# Patient Record
Sex: MALE | Race: WHITE
[De-identification: names, ages, dates, MRNs, and addresses within clinical notes are randomized per-mention and may not be internally consistent; named-entity substitution may affect disease eponyms.]

---

## 2020-02-09 ENCOUNTER — HOSPITAL ENCOUNTER (EMERGENCY)
Dept: HOSPITAL 53 - M ED | Age: 30
Discharge: HOME | End: 2020-02-09
Payer: COMMERCIAL

## 2020-02-09 VITALS — SYSTOLIC BLOOD PRESSURE: 124 MMHG | DIASTOLIC BLOOD PRESSURE: 75 MMHG

## 2020-02-09 VITALS — HEIGHT: 72 IN | BODY MASS INDEX: 31.05 KG/M2 | WEIGHT: 229.28 LBS

## 2020-02-09 DIAGNOSIS — F10.231: Primary | ICD-10-CM

## 2020-02-09 DIAGNOSIS — F41.9: ICD-10-CM
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
## 2020-02-09 DIAGNOSIS — S00.03XA: ICD-10-CM

## 2020-02-09 DIAGNOSIS — Z79.899: ICD-10-CM

## 2020-02-09 LAB
ALBUMIN SERPL BCG-MCNC: 4.5 GM/DL (ref 3.2–5.2)
ALT SERPL W P-5'-P-CCNC: 43 U/L (ref 12–78)
AMPHETAMINES UR QL SCN: NEGATIVE
ANISOCYTOSIS BLD QL SMEAR: (no result)
APAP SERPL-MCNC: < 2 UG/ML (ref 10–30)
BARBITURATES UR QL SCN: NEGATIVE
BENZODIAZ UR QL SCN: NEGATIVE
BILIRUB CONJ SERPL-MCNC: 0.2 MG/DL (ref 0–0.2)
BILIRUB SERPL-MCNC: 0.7 MG/DL (ref 0.2–1)
BZE UR QL SCN: NEGATIVE
CANNABINOIDS UR QL SCN: NEGATIVE
CK MB CFR.DF SERPL CALC: 1.31
CK MB SERPL-MCNC: 2.3 NG/ML (ref ?–3.6)
CK SERPL-CCNC: 175 U/L (ref 39–308)
ETHANOL SERPL-MCNC: < 0.003 % (ref 0–0.01)
HCT VFR BLD AUTO: 45.4 % (ref 42–52)
HGB BLD-MCNC: 15.3 G/DL (ref 13.5–17.5)
LYMPHOCYTES NFR BLD MANUAL: 27 % (ref 16–44)
MACROCYTES BLD QL SMEAR: (no result)
MCH RBC QN AUTO: 29.9 PG (ref 27–33)
MCHC RBC AUTO-ENTMCNC: 33.7 G/DL (ref 32–36.5)
MCV RBC AUTO: 88.7 FL (ref 80–96)
METHADONE UR QL SCN: NEGATIVE
MONOCYTES NFR BLD MANUAL: 8 % (ref 0–5)
NEUTROPHILS NFR BLD MANUAL: 62 % (ref 28–66)
OPIATES UR QL SCN: NEGATIVE
PCP UR QL SCN: NEGATIVE
PLATELET # BLD AUTO: 287 10^3/UL (ref 150–450)
PLATELET BLD QL SMEAR: NORMAL
PROT SERPL-MCNC: 7.7 GM/DL (ref 6.4–8.2)
RBC # BLD AUTO: 5.12 10^6/UL (ref 4.3–6.1)
SALICYLATES SERPL-MCNC: < 1.7 MG/DL (ref 5–30)
TROPONIN I SERPL-MCNC: < 0.02 NG/ML (ref ?–0.1)
TSH SERPL DL<=0.005 MIU/L-ACNC: 1.57 UIU/ML (ref 0.36–3.74)
VARIANT LYMPHS NFR BLD MANUAL: 3 % (ref 0–5)
WBC # BLD AUTO: 12.5 10^3/UL (ref 4–10)

## 2020-02-09 PROCEDURE — 99285 EMERGENCY DEPT VISIT HI MDM: CPT

## 2020-02-09 PROCEDURE — 80076 HEPATIC FUNCTION PANEL: CPT

## 2020-02-09 PROCEDURE — 82550 ASSAY OF CK (CPK): CPT

## 2020-02-09 PROCEDURE — 71045 X-RAY EXAM CHEST 1 VIEW: CPT

## 2020-02-09 PROCEDURE — 93005 ELECTROCARDIOGRAM TRACING: CPT

## 2020-02-09 PROCEDURE — 93041 RHYTHM ECG TRACING: CPT

## 2020-02-09 PROCEDURE — 96374 THER/PROPH/DIAG INJ IV PUSH: CPT

## 2020-02-09 PROCEDURE — 94760 N-INVAS EAR/PLS OXIMETRY 1: CPT

## 2020-02-09 PROCEDURE — 82553 CREATINE MB FRACTION: CPT

## 2020-02-09 PROCEDURE — 85025 COMPLETE CBC W/AUTO DIFF WBC: CPT

## 2020-02-09 PROCEDURE — 70450 CT HEAD/BRAIN W/O DYE: CPT

## 2020-02-09 PROCEDURE — 83605 ASSAY OF LACTIC ACID: CPT

## 2020-02-09 PROCEDURE — 80307 DRUG TEST PRSMV CHEM ANLYZR: CPT

## 2020-02-09 PROCEDURE — 80047 BASIC METABLC PNL IONIZED CA: CPT

## 2020-02-09 PROCEDURE — 82140 ASSAY OF AMMONIA: CPT

## 2020-02-09 PROCEDURE — 96361 HYDRATE IV INFUSION ADD-ON: CPT

## 2020-02-09 PROCEDURE — 84484 ASSAY OF TROPONIN QUANT: CPT

## 2020-02-09 PROCEDURE — 84443 ASSAY THYROID STIM HORMONE: CPT

## 2020-02-09 RX ADMIN — LORAZEPAM STA MG: 2 INJECTION INTRAMUSCULAR; INTRAVENOUS at 12:10

## 2020-02-09 RX ADMIN — LORAZEPAM STA MG: 2 INJECTION INTRAMUSCULAR; INTRAVENOUS at 12:49

## 2020-02-09 NOTE — ECGEPIP
[facility] - ED

                                       

                                       Test Date:    2020

Pat Name:     MALINA MILLER      Department:   

Patient ID:   [id number]                 Room:         -

Gender:       Male                     Technician:   GA

:          1990               Requested By: Maja Lundborg-Gray 

Order Number: [id number]     Reading MD:   Maja Lundborg-Gray

                                 Measurements

Intervals                              Axis          

Rate:         75                       P:            68

CA:           148                      QRS:          42

QRSD:         101                      T:            20

QT:           401                                    

QTc:          449                                    

                           Interpretive Statements

SINUS RHYTHM

NONSPECIFIC ST T WAVE CHANGES

NO PRIOR ECG FOR COMPARISON

Electronically Signed on 2020 19:15:40 EST by Maja Lundborg-Gray

## 2020-02-09 NOTE — REP
Clinical:  Trauma .

 

Comparison: None .

 

Findings:

Scalp contusion and hematoma overlies the left frontal bone.  The ventricles,

sulci, and cisterns are normal in position and appearance.  Gray-white

differentiation is maintained.  No acute intracranial hemorrhage, mass/mass

effect, pathology or trauma/injury.  No evidence for acute infarction.  No

extra-axial fluid collection.  Calvarium is intact.  Paranasal sinuses and

mastoid air cells are clear.

 

Impression:

Scalp contusion/hematoma overlying the left frontal bone.

No evidence for acute intracranial pathology or trauma/injury.

 

 

Electronically Signed by

Sina Zee MD 02/09/2020 12:28 P

## 2020-02-09 NOTE — REP
Clinical:  Altered mental status .

 

Comparison:  None .

 

Findings:

The mediastinum and cardiac silhouette are stable and within normal limits for

portable technique.  The lung fields are clear without acute consolidation,

effusion, or pneumothorax.  Skeletal structures are intact.

 

Impression:

No acute cardiopulmonary process appreciated.

 

 

Electronically Signed by

Sina Zee MD 02/09/2020 12:24 P

## 2020-03-24 ENCOUNTER — HOSPITAL ENCOUNTER (EMERGENCY)
Dept: HOSPITAL 53 - M ED | Age: 30
Discharge: HOME | End: 2020-03-24
Payer: COMMERCIAL

## 2020-03-24 VITALS — SYSTOLIC BLOOD PRESSURE: 125 MMHG | DIASTOLIC BLOOD PRESSURE: 67 MMHG

## 2020-03-24 VITALS — HEIGHT: 72 IN | WEIGHT: 222.89 LBS | BODY MASS INDEX: 30.19 KG/M2

## 2020-03-24 DIAGNOSIS — Z79.899: ICD-10-CM

## 2020-03-24 DIAGNOSIS — F10.10: Primary | ICD-10-CM

## 2020-03-24 DIAGNOSIS — F41.9: ICD-10-CM

## 2020-03-24 PROCEDURE — 36415 COLL VENOUS BLD VENIPUNCTURE: CPT

## 2020-03-24 PROCEDURE — 99284 EMERGENCY DEPT VISIT MOD MDM: CPT

## 2020-08-23 ENCOUNTER — HOSPITAL ENCOUNTER (EMERGENCY)
Dept: HOSPITAL 53 - M ED | Age: 30
Discharge: HOME | End: 2020-08-23
Payer: COMMERCIAL

## 2020-08-23 VITALS — HEIGHT: 72 IN | BODY MASS INDEX: 27.82 KG/M2 | WEIGHT: 205.43 LBS

## 2020-08-23 VITALS — SYSTOLIC BLOOD PRESSURE: 121 MMHG | DIASTOLIC BLOOD PRESSURE: 77 MMHG
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
## 2020-08-23 DIAGNOSIS — S01.81XA: Primary | ICD-10-CM

## 2020-08-23 DIAGNOSIS — F10.10: ICD-10-CM

## 2020-08-23 DIAGNOSIS — M50.321: ICD-10-CM

## 2020-08-23 DIAGNOSIS — J34.1: ICD-10-CM
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
## 2020-09-23 ENCOUNTER — HOSPITAL ENCOUNTER (EMERGENCY)
Dept: HOSPITAL 53 - M ED | Age: 30
Discharge: HOME | End: 2020-09-23
Payer: COMMERCIAL

## 2020-09-23 VITALS — SYSTOLIC BLOOD PRESSURE: 124 MMHG | DIASTOLIC BLOOD PRESSURE: 65 MMHG

## 2020-09-23 VITALS — BODY MASS INDEX: 27.15 KG/M2 | HEIGHT: 72 IN | WEIGHT: 200.42 LBS

## 2020-09-23 DIAGNOSIS — F10.239: Primary | ICD-10-CM

## 2020-09-23 DIAGNOSIS — F33.9: ICD-10-CM

## 2020-09-23 DIAGNOSIS — Z79.899: ICD-10-CM

## 2020-09-23 DIAGNOSIS — F41.1: ICD-10-CM

## 2020-09-23 LAB
ALBUMIN SERPL BCG-MCNC: 4.4 GM/DL (ref 3.2–5.2)
ALT SERPL W P-5'-P-CCNC: 35 U/L (ref 12–78)
APAP SERPL-MCNC: < 2 UG/ML (ref 10–30)
BILIRUB CONJ SERPL-MCNC: 0.2 MG/DL (ref 0–0.2)
BILIRUB SERPL-MCNC: 0.8 MG/DL (ref 0.2–1)
BUN SERPL-MCNC: 14 MG/DL (ref 7–18)
CALCIUM SERPL-MCNC: 10.2 MG/DL (ref 8.5–10.1)
CHLORIDE SERPL-SCNC: 103 MEQ/L (ref 98–107)
CO2 SERPL-SCNC: 24 MEQ/L (ref 21–32)
CREAT SERPL-MCNC: 0.94 MG/DL (ref 0.7–1.3)
ETHANOL SERPL-MCNC: < 0.003 % (ref 0–0.01)
GFR SERPL CREATININE-BSD FRML MDRD: > 60 ML/MIN/{1.73_M2} (ref 60–?)
GLUCOSE SERPL-MCNC: 83 MG/DL (ref 70–100)
HCT VFR BLD AUTO: 41 % (ref 42–52)
HGB BLD-MCNC: 14.5 G/DL (ref 13.5–17.5)
MCH RBC QN AUTO: 31.1 PG (ref 27–33)
MCHC RBC AUTO-ENTMCNC: 35.4 G/DL (ref 32–36.5)
MCV RBC AUTO: 88 FL (ref 80–96)
PLATELET # BLD AUTO: 325 10^3/UL (ref 150–450)
POTASSIUM SERPL-SCNC: 3.9 MEQ/L (ref 3.5–5.1)
PROT SERPL-MCNC: 7.5 GM/DL (ref 6.4–8.2)
RBC # BLD AUTO: 4.66 10^6/UL (ref 4.3–6.1)
SALICYLATES SERPL-MCNC: < 1.7 MG/DL (ref 5–30)
SODIUM SERPL-SCNC: 138 MEQ/L (ref 136–145)
TSH SERPL DL<=0.005 MIU/L-ACNC: 1.35 UIU/ML (ref 0.36–3.74)
WBC # BLD AUTO: 10 10^3/UL (ref 4–10)

## 2020-09-23 PROCEDURE — 99285 EMERGENCY DEPT VISIT HI MDM: CPT

## 2020-09-23 PROCEDURE — 93005 ELECTROCARDIOGRAM TRACING: CPT

## 2020-09-23 PROCEDURE — 80076 HEPATIC FUNCTION PANEL: CPT

## 2020-09-23 PROCEDURE — 96361 HYDRATE IV INFUSION ADD-ON: CPT

## 2020-09-23 PROCEDURE — 85027 COMPLETE CBC AUTOMATED: CPT

## 2020-09-23 PROCEDURE — 84443 ASSAY THYROID STIM HORMONE: CPT

## 2020-09-23 PROCEDURE — 80048 BASIC METABOLIC PNL TOTAL CA: CPT

## 2020-09-23 PROCEDURE — 96360 HYDRATION IV INFUSION INIT: CPT

## 2020-09-24 NOTE — REP
CT CERVICAL SPINE WITHOUT CONTRAST



HISTORY: Injury.



COMPARISON: None.



CT FINDINGS: 

Digital preliminary  views are unremarkable. There is straightening of the 
normal cervical lordosis. No fracture or collapse is seen. There is mild 
degenerative disc change at C4-5 with anterior spurring. Disc spaces are 
maintained. Facets normally aligned at each lumbar level. Prevertebral soft 
tissues are not widened. 



IMPRESSION: 

Straightening. Otherwise negative CT study of the cervical spine. Minimal 
degenerative disc disease at C4-5.  

MTDD

## 2020-09-24 NOTE — ECGEPIP
[facility] - ED

                                       

                                       Test Date:    2020

Pat Name:     MALINA MILLER      Department:   

Patient ID:   [id number]                 Room:         -

Gender:       Male                     Technician:   [id number]

:          1990               Requested By: DOMINICK ANTOINE

Order Number: [id number]     Reading MD:   Antoinette Spivey

                                 Measurements

Intervals                              Axis          

Rate:         62                       P:            59

AK:           138                      QRS:          46

QRSD:         98                       T:            38

QT:           443                                    

QTc:          452                                    

                           Interpretive Statements

SINUS RHYTHM WITH MARKED SINUS ARRHYTHMIA

Electronically Signed on 2020 20:46:54 EDT by Antoinette Spivey

## 2020-09-24 NOTE — REP
CT BRAIN WITHOUT CONTRAST



HISTORY: Injury. 



COMPARISON: Head CT study 02/09/2020.



FINDINGS: 

Digital preliminary  radiograph is unremarkable. On bone window settings, 
the bony calvarium is intact. No scalp hematoma or skull fracture is 
appreciated. Visualized paranasal sinuses are clear. No intraorbital abnormality
is seen. 



On soft tissue window settings, the lateral, third, and fourth ventricles are 
normal in size and position. Gray-white differentiation pattern is normal above 
and below the tentorium. There is no evidence of intracranial hemorrhage. No 
mass, infarct, extra-axial fluid collection, or midline shift is seen.



IMPRESSION: 

Negative noncontrast head CT.  

MTDD

## 2020-09-24 NOTE — REP
MAXILLOFACIAL CT STUDY



HISTORY: Injury. Chin laceration and contusion.



FINDINGS: 

There are mucous retention cysts, one on each side in the maxillary sinuses. No 
maxillary or mandibular fracture is seen. No condylar or condylar neck fracture 
is seen. Orbital margins are intact. The inferior maxillary spine and the nasal 
bone have an intact appearance. No intraorbital hematoma is seen. Visualized 
paranasal sinuses are otherwise clear. The visualized intracranial structures 
are unremarkable.



IMPRESSION: 

No maxillary or mandibular fracture seen. There is a mucous retention cyst in 
each maxillary sinus.  

MTDD

## 2023-03-20 ENCOUNTER — HOSPITAL ENCOUNTER (EMERGENCY)
Dept: HOSPITAL 53 - M ED | Age: 33
Discharge: HOME | End: 2023-03-20
Payer: COMMERCIAL

## 2023-03-20 VITALS — WEIGHT: 209.88 LBS | HEIGHT: 72 IN | BODY MASS INDEX: 28.43 KG/M2

## 2023-03-20 VITALS — SYSTOLIC BLOOD PRESSURE: 138 MMHG | DIASTOLIC BLOOD PRESSURE: 82 MMHG

## 2023-03-20 DIAGNOSIS — Z88.8: ICD-10-CM

## 2023-03-20 DIAGNOSIS — S30.0XXA: Primary | ICD-10-CM

## 2023-03-20 DIAGNOSIS — F10.20: ICD-10-CM
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
## 2023-03-20 DIAGNOSIS — F07.81: ICD-10-CM

## 2023-03-20 DIAGNOSIS — S00.93XA: ICD-10-CM

## 2023-03-20 LAB
ALBUMIN SERPL BCG-MCNC: 4.6 G/DL (ref 3.2–5.2)
ALP SERPL-CCNC: 67 U/L (ref 46–116)
ALT SERPL W P-5'-P-CCNC: 63 U/L (ref 7–40)
AMYLASE SERPL-CCNC: 59 U/L (ref 30–118)
APTT BLD: 25.9 SECONDS (ref 24.8–34.2)
AST SERPL-CCNC: 59 U/L (ref ?–34)
BASOPHILS # BLD AUTO: 0 10^3/UL (ref 0–0.2)
BASOPHILS NFR BLD AUTO: 0.5 % (ref 0–1)
BILIRUB CONJ SERPL-MCNC: 0.4 MG/DL (ref ?–0.4)
BILIRUB SERPL-MCNC: 1.6 MG/DL (ref 0.3–1.2)
BUN SERPL-MCNC: 9 MG/DL (ref 9–23)
CALCIUM SERPL-MCNC: 9.1 MG/DL (ref 8.5–10.1)
CHLORIDE SERPL-SCNC: 103 MMOL/L (ref 98–107)
CO2 SERPL-SCNC: 27 MMOL/L (ref 20–31)
CREAT SERPL-MCNC: 0.74 MG/DL (ref 0.7–1.3)
EOSINOPHIL # BLD AUTO: 0 10^3/UL (ref 0–0.5)
EOSINOPHIL NFR BLD AUTO: 0.1 % (ref 0–3)
GFR SERPL CREATININE-BSD FRML MDRD: > 60 ML/MIN/{1.73_M2} (ref 60–?)
GLUCOSE SERPL-MCNC: 82 MG/DL (ref 60–100)
HCT VFR BLD AUTO: 42.1 % (ref 42–52)
HCT VFR BLD AUTO: 43.7 % (ref 42–52)
HGB BLD-MCNC: 14.9 G/DL (ref 13.5–17.5)
HGB BLD-MCNC: 15 G/DL (ref 13.5–17.5)
INR PPP: 0.91
LIPASE SERPL-CCNC: 27 U/L (ref 12–53)
LYMPHOCYTES # BLD AUTO: 2.9 10^3/UL (ref 1.5–5)
LYMPHOCYTES NFR BLD AUTO: 33.3 % (ref 24–44)
MCH RBC QN AUTO: 30.1 PG (ref 27–33)
MCH RBC QN AUTO: 30.8 PG (ref 27–33)
MCHC RBC AUTO-ENTMCNC: 34.3 G/DL (ref 32–36.5)
MCHC RBC AUTO-ENTMCNC: 35.4 G/DL (ref 32–36.5)
MCV RBC AUTO: 87.2 FL (ref 80–96)
MCV RBC AUTO: 87.6 FL (ref 80–96)
MONOCYTES # BLD AUTO: 0.8 10^3/UL (ref 0–0.8)
MONOCYTES NFR BLD AUTO: 9 % (ref 2–8)
NEUTROPHILS # BLD AUTO: 4.9 10^3/UL (ref 1.5–8.5)
NEUTROPHILS NFR BLD AUTO: 56.9 % (ref 36–66)
PLATELET # BLD AUTO: 301 10^3/UL (ref 150–450)
PLATELET # BLD AUTO: 314 10^3/UL (ref 150–450)
POTASSIUM SERPL-SCNC: 3.9 MMOL/L (ref 3.5–5.1)
PROT SERPL-MCNC: 7.4 G/DL (ref 5.7–8.2)
PROTHROMBIN TIME: 12.5 SECONDS (ref 12.5–14.5)
RBC # BLD AUTO: 4.83 10^6/UL (ref 4.3–6.1)
RBC # BLD AUTO: 4.99 10^6/UL (ref 4.3–6.1)
SODIUM SERPL-SCNC: 136 MMOL/L (ref 136–145)
WBC # BLD AUTO: 8.6 10^3/UL (ref 4–10)
WBC # BLD AUTO: 9.2 10^3/UL (ref 4–10)